# Patient Record
Sex: MALE | Race: WHITE
[De-identification: names, ages, dates, MRNs, and addresses within clinical notes are randomized per-mention and may not be internally consistent; named-entity substitution may affect disease eponyms.]

---

## 2018-10-06 ENCOUNTER — HOSPITAL ENCOUNTER (EMERGENCY)
Dept: HOSPITAL 62 - ER | Age: 28
Discharge: HOME | End: 2018-10-06
Payer: SELF-PAY

## 2018-10-06 VITALS — DIASTOLIC BLOOD PRESSURE: 64 MMHG | SYSTOLIC BLOOD PRESSURE: 122 MMHG
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
## 2018-10-06 DIAGNOSIS — S89.92XA: Primary | ICD-10-CM

## 2018-10-06 DIAGNOSIS — M25.562: ICD-10-CM

## 2018-10-06 PROCEDURE — 73564 X-RAY EXAM KNEE 4 OR MORE: CPT

## 2018-10-06 PROCEDURE — 99283 EMERGENCY DEPT VISIT LOW MDM: CPT

## 2018-10-06 PROCEDURE — L1830 KO IMMOB CANVAS LONG PRE OTS: HCPCS

## 2018-10-06 NOTE — ER DOCUMENT REPORT
HPI





- HPI


Patient complains to provider of: Left knee pain


Pain Level: 3


Context: 





Patient is a 28-year-old male that comes to the emergency department for of 

left knee pain.  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 

working he sit up and suddenly felt a sharp pain in his knee, states pain is 

increased today and it hurts when he walks on it on the lower outer part of his 

knee.  He denies any severe swelling, he has ice to the knee, he denies numbness

, hip pain, ankle pain, back pain, or any other complaints.  Only past medical 

history reported is tonsillectomy.





Past Medical History





- General


Information source: Patient





- Social History


Smoking Status: Never Smoker


Lives with: Family


Family History: Reviewed & Not Pertinent





- Medical History


Medical History: Negative


Past Surgical History: Reports: Hx Tonsillectomy





- Immunizations


Hx Diphtheria, Pertussis, Tetanus Vaccination: Yes





Vertical Provider Document





- CONSTITUTIONAL


General Appearance: WD/WN, No Apparent Distress





- INFECTION CONTROL


TRAVEL OUTSIDE OF THE [place] IN LAST 30 DAYS: No





- HEENT


HEENT: Atraumatic, Normal ENT Exam, Normocephalic





- NECK


Neck: Normal Inspection





- RESPIRATORY


Respiratory: Breath Sounds Normal, No Respiratory Distress





- CARDIOVASCULAR


Cardiovascular: Regular Rate, Regular Rhythm





- GI/ABDOMEN


Gastrointestinal: Abdomen Soft, Abdomen Non-Tender





- BACK


Back: Normal Inspection





- MUSCULOSKELETAL/EXTREMETIES


Musculoskeletal/Extremeties: Tender - There is general mild tenderness with 

palpation around the patella of the left knee, no overt swelling, erythema, 

abnormal heat.  No wounds noted.  Full range of motion.  Normal distal 

neurovascular exam.  Normal hip, ankle exam.  Lower extremity exam unremarkable 

otherwise.





Course





- Re-evaluation


Re-evalutation: 


Knee examination has minimal tenderness, he does have some tenderness with 

walking but he is able to ambulate without severe difficulty.  No erythema, 

abnormal heat, or loss of range of motion suggesting septic joint.  No history 

of IV drug abuse.  Discussed knee immobilizer, anti-inflammatories, supportive 

measures, follow-up with orthopedics, and return precautions.  Patient states 

understanding and agreement.





- Vital Signs


Vital signs: 


 











Temp Pulse Resp BP Pulse Ox


 


 98.1 F   71   14   147/83 H  98 


 


 10/06/18 18:46  10/06/18 18:46  10/06/18 18:46  10/06/18 18:46  10/06/18 18:46














Procedures





- Immobilization


  ** Left knee


Pre-Proc Neuro Vasc Exam: Normal


Immobilizer type: Knee immobilizer


Performed by: PCT


Post-Proc Neuro Vasc Exam: Normal


Alignment checked and good: Yes





Discharge





- Discharge


Clinical Impression: 


Left knee pain


Qualifiers:


 Chronicity: acute Qualified Code(s): M25.562 - Pain in left knee





Left knee injury


Qualifiers:


 Encounter type: initial encounter Qualified Code(s): S89.92XA - Unspecified 

injury of left lower leg, initial encounter





Condition: Stable


Disposition: HOME, SELF-CARE


Additional Instructions: 


Your x-ray shows a small effusion in the knee, this is a fluid buildup that 

frequently happens with a tendon or ligament injury.  This frequently resolves 

with time, I recommend wearing the knee immobilizer and using the crutches at 

least for the first few days, take the anti-inflammatory, apply ice to the knee 

3-4 times a day for 10-15 minutes, and rest.  If symptoms continue please follow

-up with orthopedics for additional evaluation, see referral.  Return if you 

worsen including severe pain, severe swelling, redness, fever, or any other 

concerning symptoms.


Prescriptions: 


Naproxen 500 mg PO BID PRN #20 tablet


 PRN Reason: 


Forms:  Return to Work


Referrals: 


ARCHANA GARCIA MD [ACTIVE STAFF] - Follow up in 1 week

## 2018-10-06 NOTE — RADIOLOGY REPORT (SQ)
EXAM DESCRIPTION:  KNEE LEFT 4 VIEW



COMPLETED DATE/TIME:  10/6/2018 7:59 pm



REASON FOR STUDY:  injury, pain



COMPARISON:  None.



NUMBER OF VIEWS:  Four views.



TECHNIQUE:  AP, lateral, and both oblique radiographic images acquired of the left knee.



LIMITATIONS:  None.



FINDINGS:  MINERALIZATION: Normal.

BONES: No acute fracture or dislocation.  No worrisome bone lesions.

JOINT: Tiny suprapatellar knee joint effusion.

SOFT TISSUES: No soft tissue swelling.  No radio-opaque foreign body.

OTHER: No other significant finding.



IMPRESSION:  Tiny suprapatellar knee joint effusion.  No acute fracture or malalignment.



TECHNICAL DOCUMENTATION:  JOB ID:  [id number]

 2011 [employer]- All Rights Reserved



Reading location - IP/workstation name: [email or web address]